# Patient Record
Sex: MALE | Race: WHITE | Employment: OTHER | URBAN - METROPOLITAN AREA
[De-identification: names, ages, dates, MRNs, and addresses within clinical notes are randomized per-mention and may not be internally consistent; named-entity substitution may affect disease eponyms.]

---

## 2021-05-25 ENCOUNTER — OFFICE VISIT (OUTPATIENT)
Dept: ORTHOPEDIC SURGERY | Age: 71
End: 2021-05-25
Payer: MEDICARE

## 2021-05-25 VITALS — HEIGHT: 74 IN | BODY MASS INDEX: 38.89 KG/M2 | WEIGHT: 303 LBS

## 2021-05-25 DIAGNOSIS — M76.822 POSTERIOR TIBIAL TENDON DYSFUNCTION (PTTD) OF LEFT LOWER EXTREMITY: Primary | ICD-10-CM

## 2021-05-25 PROCEDURE — 99203 OFFICE O/P NEW LOW 30 MIN: CPT | Performed by: ORTHOPAEDIC SURGERY

## 2021-05-25 PROCEDURE — G8427 DOCREV CUR MEDS BY ELIG CLIN: HCPCS | Performed by: ORTHOPAEDIC SURGERY

## 2021-05-25 PROCEDURE — G8417 CALC BMI ABV UP PARAM F/U: HCPCS | Performed by: ORTHOPAEDIC SURGERY

## 2021-05-25 PROCEDURE — 3017F COLORECTAL CA SCREEN DOC REV: CPT | Performed by: ORTHOPAEDIC SURGERY

## 2021-05-25 PROCEDURE — 4040F PNEUMOC VAC/ADMIN/RCVD: CPT | Performed by: ORTHOPAEDIC SURGERY

## 2021-05-25 PROCEDURE — 4004F PT TOBACCO SCREEN RCVD TLK: CPT | Performed by: ORTHOPAEDIC SURGERY

## 2021-05-25 PROCEDURE — 1123F ACP DISCUSS/DSCN MKR DOCD: CPT | Performed by: ORTHOPAEDIC SURGERY

## 2021-05-25 NOTE — PROGRESS NOTES
[facility]  Outpatient Progress Note  Domitila Herr MD    Patient Name: Chloe Carbone MRN: [id number]   Age: 66 y.o. YOB: 1950   Sex: male      [address] Complaint   Patient presents with    New Patient     left heel pain / happened after steping on a shell         HISTORY OF PRESENT ILLNESS   Chloe Carbone is a 66 y.o. male presents the office today for evaluation of complaints of left foot and ankle pain. Symptoms began about 6 weeks ago. He was on vacation in [place] when he stepped on a large seashell and sustained a pronation injury to his ankle and felt a sharp pain and tearing sensation in the medial ankle and foot. He says that he had severe difficulty ambulating immediately following the injury. With time and rest he has improved but he still has significant pain after he has been standing and with his first couple of steps he feels a sharp pain in the medial ankle. He had difficulty in the past with some midfoot arthritis and he wears longitudinal arch supports. He has had no previous evaluation or treatment for this particular injury. PAST MEDICAL HISTORY      Past Medical History:   Diagnosis Date    Hyperlipidemia     Hypertension     hx of. off meds x 2004.  Unspecified sleep apnea        PAST SURGICAL HISTORY     Past Surgical History:   Procedure Laterality Date    COLON SURGERY      polypectomy 2003/ 2006.  KNEE ARTHROSCOPY      right 12/06. MEDICATIONS     Current Outpatient Medications   Medication Sig Dispense Refill    Olopatadine HCl 0.6 % SOLN 2 sprays by Nasal route daily. 1 Bottle 3    atorvastatin (LIPITOR) 40 MG tablet Take 1 tablet by mouth daily. 1/2 po every evening. 135 tablet 1    zolpidem (AMBIEN) 5 MG tablet Take 1 tablet by mouth nightly as needed. 90 tablet 1    fenofibrate (TRICOR) 145 MG tablet Take 145 mg by mouth daily.  aspirin 81 MG EC tablet Take 81 mg by mouth daily.       fluticasone (FLONASE) 50 MCG/ACT nasal spray 2 sprays by Nasal route daily.  Folic Acid-Vit P5-BUE O98 (FOLBEE PO) Take  by mouth 3 times daily.  fexofenadine (ALLEGRA) 180 MG tablet Take 180 mg by mouth daily.  lisinopril (PRINIVIL;ZESTRIL) 10 MG tablet Take 10 mg by mouth daily.  albuterol (PROVENTIL;VENTOLIN) 90 MCG/ACT inhaler Inhale 2 puffs into the lungs every 4 hours as needed.  promethazine-codeine (PHENERGAN WITH CODEINE) 6.25-10 MG/5ML syrup Take 5 mLs by mouth 4 times daily as needed. No current facility-administered medications for this visit. ALLERGIES     Allergies   Allergen Reactions    Sulfa Antibiotics Hives       FAMILY HISTORY     Family History   Problem Relation Age of Onset    Heart Disease Mother     High Cholesterol Mother     Cancer Father         stomach    Cancer Sister         kidney/ ovarian    Cancer Brother         stomach    Diabetes Maternal Grandmother        SOCIAL HISTORY     Social History     Socioeconomic History    Marital status:      Spouse name: None    Number of children: None    Years of education: None    Highest education level: None   Occupational History    None   Tobacco Use    Smoking status: None   Substance and Sexual Activity    Alcohol use: None    Drug use: None    Sexual activity: None   Other Topics Concern    None   Social History Narrative    None     Social Determinants of Health     Financial Resource Strain:     Difficulty of Paying Living Expenses:    Food Insecurity:     Worried About Running Out of Food in the Last Year:     Ran Out of Food in the Last Year:    Transportation Needs:     Lack of Transportation (Medical):      Lack of Transportation (Non-Medical):    Physical Activity:     Days of Exercise per Week:     Minutes of Exercise per Session:    Stress:     Feeling of Stress :    Social Connections:     Frequency of Communication with Friends and Family:     Frequency of Social Gatherings with Friends and Family:     Attends Adventism Services:     Active Member of Clubs or Organizations:     Attends Club or Organization Meetings:     Marital Status:    Intimate Partner Violence:     Fear of Current or Ex-Partner:     Emotionally Abused:     Physically Abused:     Sexually Abused:        REVIEW OF SYSTEMS   General: no fever, chills, night sweats, anorexia, malaise, fatigue, or weight change  Hematologic:  no unexplained bleeding or bruising  HEENT:   no nasal congestion, rhinorrhea, sore throat, or facial pain  Respiratory:  no cough, dyspnea, or chest pain  Cardiovascular:  no angina, ARMENTA, PND, orthopnea, dependent edema, or palpitations  Gastrointestinal:  no nausea, vomiting, diarrhea, constipation, or abdominal pain  Genitourinary:  no urinary urgency, frequency, dysuria, or hematuria  Musculoskeletal: see HPI  Endocrine:  no heat or cold intolerance and no polyphagia, polydipsia, or polyuria  Skin:  no skin eruptions or changing lesions  Neurologic:  no focal weakness, numbness/tingling, tremor, or severe headache. See HPI. See HPI for pertinent positives. PHYSICAL EXAM   Vital Signs:   Vitals:    05/25/21 1355   Weight: (!) 303 lb (137.4 kg)   Height: 6' 2\" (1.88 m)       General appearance: healthy, alert, no distress  Skin: Skin color, texture, turgor normal. No rashes or lesions  HEENT: atraumatic, normocephalic. PERRL  Respiratory: Unlabored breathing  Lymphatic: No adenopathy   Neuro: Alert and oriented, normal distal sensation, normal bilateral DTRs  Vascular: Normal distal capillary and distal pulses  Muskuloskeletal Exam: Left foot and ankle examination demonstrates mild swelling in the medial ankle. Weightbearing alignment reveals neutral ankle, hindfoot valgus, midfoot pronation and forefoot abduction. Gait is antalgic to the left. He can barely do a single footed heel rise on the left.  He has tenderness along the retromalleolar segment of the posterior tibial tendon. He is neurovascularly intact. RADIOLOGY   No radiographs obtained today    IMPRESSION     1. Posterior tibial tendon dysfunction (PTTD) of left lower extremity         PLAN   I had a lengthy discussion with patient today regarding diagnosis and treatment options and recommendations. I'm concerned that the patient may have sustained an acute posterior tibial tendon tear. He already has a removable walking cast boot from a previous problem with the other foot. I have recommended he wear that or a lace up ankle brace for support. I have recommended MRI scan of the left ankle for further evaluation. FOLLOWUP     Return for test results. No orders of the defined types were placed in this encounter. No orders of the defined types were placed in this encounter.       Patient was instructed on appropriate use of braces, participation in home exercise programs, healthy lifestyle choices and weight loss as appropriate     Berny Rodriguez MD

## 2021-06-01 ENCOUNTER — TELEPHONE (OUTPATIENT)
Dept: ORTHOPEDIC SURGERY | Age: 71
End: 2021-06-01

## 2021-06-09 ENCOUNTER — HOSPITAL ENCOUNTER (OUTPATIENT)
Dept: MRI IMAGING | Age: 71
Discharge: HOME | End: 2021-06-09
Payer: MEDICARE

## 2021-06-09 DIAGNOSIS — M76.822 POSTERIOR TIBIAL TENDON DYSFUNCTION (PTTD) OF LEFT LOWER EXTREMITY: ICD-10-CM

## 2021-06-09 PROCEDURE — 73721 MRI JNT OF LWR EXTRE W/O DYE: CPT

## 2021-06-10 ENCOUNTER — OFFICE VISIT (OUTPATIENT)
Dept: ORTHOPEDIC SURGERY | Age: 71
End: 2021-06-10
Payer: MEDICARE

## 2021-06-10 VITALS — HEIGHT: 75 IN | TEMPERATURE: 97.9 F | WEIGHT: 303 LBS | BODY MASS INDEX: 37.67 KG/M2

## 2021-06-10 DIAGNOSIS — S93.692A RUPTURE OF PLANTAR FASCIA OF LEFT FOOT, INITIAL ENCOUNTER: Primary | ICD-10-CM

## 2021-06-10 PROCEDURE — G8427 DOCREV CUR MEDS BY ELIG CLIN: HCPCS | Performed by: ORTHOPAEDIC SURGERY

## 2021-06-10 PROCEDURE — 4040F PNEUMOC VAC/ADMIN/RCVD: CPT | Performed by: ORTHOPAEDIC SURGERY

## 2021-06-10 PROCEDURE — 3017F COLORECTAL CA SCREEN DOC REV: CPT | Performed by: ORTHOPAEDIC SURGERY

## 2021-06-10 PROCEDURE — 99213 OFFICE O/P EST LOW 20 MIN: CPT | Performed by: ORTHOPAEDIC SURGERY

## 2021-06-10 PROCEDURE — 1123F ACP DISCUSS/DSCN MKR DOCD: CPT | Performed by: ORTHOPAEDIC SURGERY

## 2021-06-10 PROCEDURE — G8417 CALC BMI ABV UP PARAM F/U: HCPCS | Performed by: ORTHOPAEDIC SURGERY

## 2021-06-10 PROCEDURE — 1036F TOBACCO NON-USER: CPT | Performed by: ORTHOPAEDIC SURGERY

## 2021-06-10 RX ORDER — DICLOFENAC SODIUM 75 MG/1
75 TABLET, DELAYED RELEASE ORAL 2 TIMES DAILY
COMMUNITY
Start: 2021-05-20

## 2021-06-10 RX ORDER — AMLODIPINE BESYLATE 5 MG/1
5 TABLET ORAL DAILY
COMMUNITY
Start: 2021-05-20

## 2021-06-10 RX ORDER — METOPROLOL SUCCINATE 25 MG/1
25 TABLET, EXTENDED RELEASE ORAL DAILY
COMMUNITY
Start: 2021-05-20

## 2021-06-11 NOTE — PROGRESS NOTES
HISTORY     Family History   Problem Relation Age of Onset    Heart Disease Mother     High Cholesterol Mother     Cancer Father         stomach    Cancer Brother         stomach    Diabetes Maternal Grandmother     Cancer Sister         kidney/ ovarian       SOCIAL HISTORY     Social History     Socioeconomic History    Marital status:      Spouse name: None    Number of children: None    Years of education: None    Highest education level: None   Occupational History    None   Tobacco Use    Smoking status: Never Smoker    Smokeless tobacco: Never Used   Substance and Sexual Activity    Alcohol use: None    Drug use: None    Sexual activity: None   Other Topics Concern    None   Social History Narrative    None     Social Determinants of Health     Financial Resource Strain:     Difficulty of Paying Living Expenses:    Food Insecurity:     Worried About Running Out of Food in the Last Year:     Ran Out of Food in the Last Year:    Transportation Needs:     Lack of Transportation (Medical):      Lack of Transportation (Non-Medical):    Physical Activity:     Days of Exercise per Week:     Minutes of Exercise per Session:    Stress:     Feeling of Stress :    Social Connections:     Frequency of Communication with Friends and Family:     Frequency of Social Gatherings with Friends and Family:     Attends Jain Services:     Active Member of Clubs or Organizations:     Attends Club or Organization Meetings:     Marital Status:    Intimate Partner Violence:     Fear of Current or Ex-Partner:     Emotionally Abused:     Physically Abused:     Sexually Abused:        REVIEW OF SYSTEMS   General: no fever, chills, night sweats, anorexia, malaise, fatigue, or weight change  Hematologic:  no unexplained bleeding or bruising  HEENT:   no nasal congestion, rhinorrhea, sore throat, or facial pain  Respiratory:  no cough, dyspnea, or chest pain  Cardiovascular:  no angina, ARMENTA, Trace effusion of the posterior subtalar joint.       Tendons: High-grade partial origin central cord plantar aponeurosis. Mild thickening in the mid signal of the lateral cords plantar aponeurosis. Achilles tendon normal. Thickening and intermediate to high signal insertion posterior tibialis tendon    consistent tendinosis. The flexor digitorum longus and flexor hallucis longus tendons intact. Peroneus longus and brevis tendons intact. Anterior tendons intact.       Ligaments: Anterior and posterior tibiofibular ligaments intact. Anterior talofibular ligament intact. Increased signal of posterior talofibular ligament consistent with partial tear. Calcaneofibular ligament not clearly visualized. Deltoid ligament    intact which spring ligament thickened.       Musculature: Mild fatty infiltration.       Subcutaneous tissues / sinus tarsi: Subcutaneous edema ankle and foot.       Other: Edema sinus Tarsi with T1 hypointense signal in most of the sinus Tarsi fat.           Impression       1. High-grade partial tear origin central cord plantar aponeurosis. 2. Tendinosis posterior tibialis insertion. 3. Edema and effacement of T1 signal in most of the sinus Tarsi. 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 4. Moderate edema of posterior talus with prominent trigonal process and small posterior subtalar joint effusion. 5. Complete tear calcaneal fibular ligament. Partial tear posterior talofibular ligament. 6. Thickening of the spring ligament.             IMPRESSION     1. Rupture of plantar fascia of left foot, initial encounter           PLAN   Anticipate continued resolution of symptoms with conservative care. FOLLOWUP     Return if symptoms worsen or fail to improve. No orders of the defined types were placed in this encounter. No orders of the defined types were placed in this encounter.       Patient was instructed on appropriate use of braces, participation in home exercise programs, healthy lifestyle choices and weight loss as appropriate     Tosha Issa MD

## 2025-08-14 ENCOUNTER — HOSPITAL ENCOUNTER (OUTPATIENT)
Dept: CARDIAC REHAB | Age: 75
Setting detail: THERAPIES SERIES
Discharge: HOME | End: 2025-08-14
Payer: MEDICARE

## 2025-08-14 PROCEDURE — 93798 PHYS/QHP OP CAR RHAB W/ECG: CPT

## 2025-08-20 ENCOUNTER — HOSPITAL ENCOUNTER (OUTPATIENT)
Dept: CARDIAC REHAB | Age: 75
Setting detail: THERAPIES SERIES
Discharge: HOME | End: 2025-08-20
Payer: MEDICARE

## 2025-08-20 PROCEDURE — 93797 PHYS/QHP OP CAR RHAB WO ECG: CPT

## 2025-08-20 PROCEDURE — 93798 PHYS/QHP OP CAR RHAB W/ECG: CPT

## 2025-08-22 ENCOUNTER — HOSPITAL ENCOUNTER (OUTPATIENT)
Dept: CARDIAC REHAB | Age: 75
Setting detail: THERAPIES SERIES
Discharge: HOME | End: 2025-08-22
Payer: MEDICARE

## 2025-08-22 PROCEDURE — 93798 PHYS/QHP OP CAR RHAB W/ECG: CPT

## 2025-08-25 ENCOUNTER — HOSPITAL ENCOUNTER (OUTPATIENT)
Dept: CARDIAC REHAB | Age: 75
Setting detail: THERAPIES SERIES
Discharge: HOME | End: 2025-08-25
Payer: MEDICARE

## 2025-08-25 PROCEDURE — 93798 PHYS/QHP OP CAR RHAB W/ECG: CPT

## 2025-08-27 ENCOUNTER — HOSPITAL ENCOUNTER (OUTPATIENT)
Dept: CARDIAC REHAB | Age: 75
Setting detail: THERAPIES SERIES
Discharge: HOME | End: 2025-08-27
Payer: MEDICARE

## 2025-08-27 PROCEDURE — 93798 PHYS/QHP OP CAR RHAB W/ECG: CPT

## 2025-08-29 ENCOUNTER — HOSPITAL ENCOUNTER (OUTPATIENT)
Dept: CARDIAC REHAB | Age: 75
Setting detail: THERAPIES SERIES
Discharge: HOME | End: 2025-08-29
Payer: MEDICARE

## 2025-08-29 PROCEDURE — 93798 PHYS/QHP OP CAR RHAB W/ECG: CPT

## 2025-09-03 ENCOUNTER — HOSPITAL ENCOUNTER (OUTPATIENT)
Dept: CARDIAC REHAB | Age: 75
Setting detail: THERAPIES SERIES
Discharge: HOME | End: 2025-09-03
Payer: MEDICARE

## 2025-09-03 PROCEDURE — 93798 PHYS/QHP OP CAR RHAB W/ECG: CPT

## 2025-09-05 ENCOUNTER — HOSPITAL ENCOUNTER (OUTPATIENT)
Dept: CARDIAC REHAB | Age: 75
Setting detail: THERAPIES SERIES
Discharge: HOME | End: 2025-09-05
Payer: MEDICARE

## 2025-09-05 PROCEDURE — 93798 PHYS/QHP OP CAR RHAB W/ECG: CPT
